# Patient Record
(demographics unavailable — no encounter records)

---

## 2024-11-15 NOTE — HISTORY OF PRESENT ILLNESS
[FreeTextEntry1] : 55-year-old P2 postmenopausal without HRT now complaining of increasing discomfort with intercourse due to vaginal dryness.  Has tried some estradiol in the past, she thought it made her ovaries hurt so she stopped.  However, nothing else has worked for her including hyaluronic acid and she would like to try the estradiol again.

## 2025-03-17 NOTE — PHYSICAL EXAM
[Chaperone Declined] : Patient declined chaperone [Appropriately responsive] : appropriately responsive [Alert] : alert [No Acute Distress] : no acute distress [Soft] : soft [Non-tender] : non-tender [Non-distended] : non-distended [No HSM] : No HSM [No Mass] : no mass [Oriented x3] : oriented x3 [Examination Of The Breasts] : a normal appearance [No Discharge] : no discharge [No Masses] : no breast masses were palpable [No Lesions] : no lesions  [Labia Majora] : normal [Labia Minora] : normal [Normal] : normal [Uterine Adnexae] : normal [Tenderness] : nontender [Enlarged ___ wks] : not enlarged [FreeTextEntry4] : mild atrophic changes [FreeTextEntry5] : no CMT

## 2025-03-17 NOTE — HISTORY OF PRESENT ILLNESS
[FreeTextEntry1] : 54 y/o P2, postmenopausal without HRT presents for annual GYN exam.  In a long-term monogamous relationship. No issues with intercourse. Using estradiol with good results. Overall good health.  C/o of menopausal symptoms including sleep disturbances, night sweats and brain fog. No PMB.  Denies FH of ca of ovary, colon, breast or uterus.  Mother had osteoporosis. Pt is eating a calcium rich diet, supplementing with D3 and has been working with weights to strengthen.  Lives with daughter (graduated [employer]), son will be goes to [employer].  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 [Mammogramdate] : 3/28/24 [TextBox_19] : BI-RADS 2 [BreastSonogramDate] : 3/28/24 [TextBox_25] : BI-RADS 2 [PapSmeardate] : 1/26/24 [TextBox_31] : NILM/HPV- [BoneDensityDate] : 1/29/23 [TextBox_37] : Osteopenia spine/normal hip [ColonoscopyDate] : 5/10/22 [TextBox_43] : Polyps- 3 yr follow up

## 2025-03-17 NOTE — PLAN
[FreeTextEntry1] : Risk versus benefit of HRT reviewed with patient in detail. Including slight increased risk of cardiovascular events and breast cancer. Correct HRT use discussed with patient. Advise follow-up 2 months after initiating HRT for adjustment if indicated.  All questions answered to patient's satisfaction. Patient verbalizes understanding of all discussed.  Advise f/u with GI for repeat colonoscopy.

## 2025-03-17 NOTE — HISTORY OF PRESENT ILLNESS
[FreeTextEntry1] : 56 y/o P2, postmenopausal without HRT presents for annual GYN exam.  In a long-term monogamous relationship. No issues with intercourse. Using estradiol with good results. Overall good health.  C/o of menopausal symptoms including sleep disturbances, night sweats and brain fog. No PMB.  Denies FH of ca of ovary, colon, breast or uterus.  Mother had osteoporosis. Pt is eating a calcium rich diet, supplementing with D3 and has been working with weights to strengthen.  Lives with daughter (graduated [employer]), son will be goes to [employer].  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 [Mammogramdate] : 3/28/24 [TextBox_19] : BI-RADS 2 [BreastSonogramDate] : 3/28/24 [TextBox_25] : BI-RADS 2 [PapSmeardate] : 1/26/24 [TextBox_31] : NILM/HPV- [BoneDensityDate] : 1/29/23 [TextBox_37] : Osteopenia spine/normal hip [ColonoscopyDate] : 5/10/22 [TextBox_43] : Polyps- 3 yr follow up

## 2025-04-03 NOTE — HISTORY OF PRESENT ILLNESS
[Home] : at home, [unfilled] , at the time of the visit. [Medical Office: ([facility])___] : at the medical office located in  [Telehealth (audio & video)] : This visit was provided via telehealth using real-time 2-way audio visual technology. [Verbal consent obtained from patient] : the patient, [unfilled] [FreeTextEntry1] : Patient reports that she is feeling well.  Excellent sleeping, night sweats have resolved.  Brain fog has lifted, and mood has been improved.  She has no side effects or that bleeding.  She would like to continue with current regime.

## 2025-04-03 NOTE — REASON FOR VISIT
[Follow-Up] : a follow-up evaluation of [FreeTextEntry2] : Initiation of hormone replacement therapy